# Patient Record
Sex: FEMALE | Race: WHITE
[De-identification: names, ages, dates, MRNs, and addresses within clinical notes are randomized per-mention and may not be internally consistent; named-entity substitution may affect disease eponyms.]

---

## 2017-01-25 ENCOUNTER — HOSPITAL ENCOUNTER (OUTPATIENT)
Dept: HOSPITAL 84 - D.MRI | Age: 57
Discharge: HOME | End: 2017-01-25
Attending: ORTHOPAEDIC SURGERY
Payer: OTHER GOVERNMENT

## 2017-01-25 VITALS — BODY MASS INDEX: 38.1 KG/M2

## 2017-01-25 DIAGNOSIS — M25.572: Primary | ICD-10-CM

## 2017-03-10 ENCOUNTER — HOSPITAL ENCOUNTER (INPATIENT)
Dept: HOSPITAL 84 - D.ER | Age: 57
LOS: 1 days | Discharge: HOME | DRG: 249 | End: 2017-03-11
Attending: INTERNAL MEDICINE | Admitting: INTERNAL MEDICINE
Payer: OTHER GOVERNMENT

## 2017-03-10 VITALS
HEIGHT: 63 IN | HEIGHT: 63 IN | WEIGHT: 235.39 LBS | WEIGHT: 235.39 LBS | BODY MASS INDEX: 41.71 KG/M2 | BODY MASS INDEX: 41.71 KG/M2

## 2017-03-10 VITALS — DIASTOLIC BLOOD PRESSURE: 90 MMHG | SYSTOLIC BLOOD PRESSURE: 131 MMHG

## 2017-03-10 DIAGNOSIS — Z87.891: ICD-10-CM

## 2017-03-10 DIAGNOSIS — I21.4: Primary | ICD-10-CM

## 2017-03-10 LAB
ALBUMIN SERPL-MCNC: 3.2 G/DL (ref 3.4–5)
ALP SERPL-CCNC: 86 U/L (ref 46–116)
ALT SERPL-CCNC: 28 U/L (ref 10–68)
ANION GAP SERPL CALC-SCNC: 11.3 MMOL/L (ref 8–16)
BASOPHILS NFR BLD AUTO: 0.3 % (ref 0–2)
BILIRUB SERPL-MCNC: 0.28 MG/DL (ref 0.2–1.3)
BUN SERPL-MCNC: 8 MG/DL (ref 7–18)
CALCIUM SERPL-MCNC: 9.1 MG/DL (ref 8.5–10.1)
CHLORIDE SERPL-SCNC: 99 MMOL/L (ref 98–107)
CHOLEST/HDLC SERPL: 3.9 RATIO (ref 2.3–4.1)
CK MB SERPL-MCNC: 2.8 U/L (ref 0–3.6)
CK SERPL-CCNC: 92 UL (ref 21–215)
CO2 SERPL-SCNC: 30.9 MMOL/L (ref 21–32)
CREAT SERPL-MCNC: 0.7 MG/DL (ref 0.6–1.3)
EOSINOPHIL NFR BLD: 5.6 % (ref 0–7)
ERYTHROCYTE [DISTWIDTH] IN BLOOD BY AUTOMATED COUNT: 15.4 % (ref 11.5–14.5)
GLOBULIN SER-MCNC: 4.4 G/L
GLUCOSE SERPL-MCNC: 100 MG/DL (ref 74–106)
HCT VFR BLD CALC: 43.2 % (ref 36–48)
HDLC SERPL-MCNC: 53 MG/DL (ref 32–96)
HGB BLD-MCNC: 14 G/DL (ref 12–16)
IMM GRANULOCYTES NFR BLD: 0.4 % (ref 0–5)
LDL-HDL RATIO: 2.5 RATIO (ref 1.5–3.5)
LDLC SERPL-MCNC: 135 MG/DL (ref 0–100)
LYMPHOCYTES NFR BLD AUTO: 22.5 % (ref 15–50)
MCH RBC QN AUTO: 27.6 PG (ref 26–34)
MCHC RBC AUTO-ENTMCNC: 32.4 G/DL (ref 31–37)
MCV RBC: 85.2 FL (ref 80–100)
MONOCYTES NFR BLD: 6.2 % (ref 2–11)
NEUTROPHILS NFR BLD AUTO: 65 % (ref 40–80)
OSMOLALITY SERPL CALC.SUM OF ELEC: 271 MOSM/KG (ref 275–300)
PLATELET # BLD: 279 10X3/UL (ref 130–400)
PMV BLD AUTO: 10.6 FL (ref 7.4–10.4)
POTASSIUM SERPL-SCNC: 4.2 MMOL/L (ref 3.5–5.1)
PROT SERPL-MCNC: 7.6 G/DL (ref 6.4–8.2)
RBC # BLD AUTO: 5.07 10X6/UL (ref 4–5.4)
SODIUM SERPL-SCNC: 137 MMOL/L (ref 136–145)
TRIGL SERPL-MCNC: 103 MG/DL (ref 30–200)
TROPONIN I SERPL-MCNC: 0.49 NG/ML (ref 0–0.06)
WBC # BLD AUTO: 10.5 10X3/UL (ref 4.8–10.8)

## 2017-03-10 NOTE — HEMODYNAMI
PATIENT:VALDO CAMACHO                        MEDICAL RECORD: [id number]
: 60                                            LOCATION[facility]     D.2123
[id number]                                       ADMISSION DATE: 03/10/17
 
 
 Generatedon:3/11/685255:13
Patient name: VALDO CAMACHO Patient #: [id number] Visit #: [id number] SSN: YOB: 1960
Date of study: 3/11/2017
Page: Of
Hemodynamic Procedure Report
****************************
Patient Data
Patient Demographics
Procedure consent was obtained
First Name: VALDO        Gender: Female
Last Name: DOUG            : 1960
Middle Initial: ANDRZEJ FORMAN  Age: 57 year(s)
Patient #: [id number]       Race: Unknown
Visit #: [id number]
Accession #:
[id number]
Additional ID: [id number]
Contact details
Address: [address] [place] [address] ROAD    Phone: [phone]
State: [place]
City: [place]
Zip code: [address]
Past Medical History
Allergies: No known allergies
Admission
Admission Data
Admission Date: 3/10/2017   Admission Time: 18:10
Admit Source: Emergency     Insurance Payor: Private
department                  health insurance
Room #: D.2123
Height (in.): 63            BSA: 1.99 (m2)
Height (cm.): 160.02        BMI: 38.09 (kg/m2)
Weight (lbs.): 215
Weight (kg.): 97.52
Lab Results
Lab Result Date: 3/10/2017  Lab Result Time: 5:00
Biochemistry
Name         Units    Result                Min      Max
BUN          mg/dl    8        --(*---)--   7        18
Creatinine   mg/dl    0.7      --(*---)--   0.6      1.3
CBC
Name         Units    Result                Min      Max
Hematocrit   %        43.2     --(*---)--   42       54
Hemoglobin   g/dl     14       --(*---)--   13.5     17.5
Procedure
Procedure Types
Cath Procedure
Diagnostic Procedure
C
[facility] w/Coronaries
PCI Procedure
Coronary Stent Initial
Miscellaneous Procedures
 
Moderate Sedation up to 15 minutes
Procedure Description
Procedure Date
Procedure Date: 3/11/2017
Procedure Start Time: 9:58
Procedure End Time: 10:13
Procedure Staff
Name                            Function
Ha Cisneros MD                Performing Physician
Brenda Brush RT             Scrub
Ricci Mir RN                Nurse
Boubacar Pineda RT                  Monitor
Procedure Data
Cath Procedure
Fluoroscopy
Diagnostic fluoroscopy      Total fluoroscopy dose: 292
dose: 292 mGy               mGy
Contrast Material
Contrast Material Type                       Amount (ml)
Isovue 300                                   59
Entry Location
Entry     Primary  Successful  Side  Size  Upsize Upsize Entry    Closure Succes
sful  Closure
Location                             (Fr)  1 (Fr) 2 (Fr) Remarks  Device        
      Remarks
Femoral                        Right 5 Fr  6 Fr                   Exoseal
artery                                     Short
Estimated blood loss: 10 ml
Diagnostic catheters
Device Type               Used For           End Catheter
Placement
Cordis 5Fr Pigtail        Procedure
Catheter (MP)
Cordis 5Fr JL 4.0         Procedure
Catheter (MP)
Cordis 5Fr 3DRC Catheter  Procedure
(MP)
Procedure Complications
No complications
Procedure Medications
Medication           Administration Route Dosage
Oxygen               NC                   2 l/min
Heparin Flush Bag    added to field       2 bags
(1000units/500ml NS)
0.9% NaCl            I.V.                 100 ml/hr
Benadryl             I.V.                 50 mg
Fentanyl             I.V.                 50 mcg
Versed               I.V.                 1 mg
Fentanyl             I.V.                 50 mcg
Versed               I.V.                 1 mg
Heparin Bolus        I.V.                 4000 units
Plavix               P.O.                 75 mg
Hemodynamics
Rest
BSA: 1.99 (m2) HGB: 14 (g/dl) O2 Consumption: Estimated: 193.48 (ml/min) O2 Cons
umption indexed:
Estimated:97.23 (ml/min/m) Heart Rate: 74 (bpm)
Snapshots
Pre Cath      Intra         NCS           Post Cath
Vital Signs
 
Time     Heart  Resp   SPO2 etCO2   [id number] NIBP (mmHg) Rhythm  Pain    Sedation
Rate   (ipm)  (%)  (mmHg)  (mmHg)                      Status  Level
(bpm)
9:49:03  71     21     96   0       0       117/80(98)  NSR     0 (11)  10(A)
, No
pain
9:53:13  72     20     93   0       0       120/77(101) NSR     0 (11)  10(A)
, No
pain
9:57:19  71     17     94   0       0       114/77(96)  NSR     0 (11)  10(A)
, No
pain
10:01:29 79     18     95   0       0       116/72(91)  NSR     0 (11)  9(A)
, No
pain
10:05:35 83     18     95   0       0       122/75(105) NSR     0 (11)  9(A)
, No
pain
10:09:44 84     18     94   0       0       107/63(89)  NSR     0 (11)  10(A)
, No
pain
Medications
Time     Medication       Route  Dose  Verified Delivered Reason          Notes 
 Effectiveness
by       by
9:51:16  Oxygen           NC     2     Ricci   Ricci    Per physician
l/min Clarke Mir RN
RN
9:51:28  Heparin Flush    added  2     Ricci   Ricci    used for
Bag              to     bags  Clarke iMr RN procedure
(1000units/500ml field        RN
NS)
9:51:42  0.9% NaCl        I.V.   100   Ricci   Ricci    Per physician
ml/hr Clarke Mir RN
RN
9:52:06  Benadryl         I.V.   50 mg Ricci   Ricci    Per physician
Clarke Mir RN
RN
9:57:39  Fentanyl         I.V.   50    Ricci   Ricci    for sedation
mcg   Clarke Mir RN
RN
9:57:47  Versed           I.V.   1 mg  Ricci   Ricci    for sedation
Clarke Mir RN
RN
10:02:31 Fentanyl         I.V.   50    Ricci   Ricci    for sedation
mcg   Clarke Mir RN
RN
10:02:35 Versed           I.V.   1 mg  Ricci   Ricci    for sedation
Clarke Mir RN
RN
10:02:56 Heparin Bolus    I.V.   4000  Ricci   Ricci    for
units Clarke Mir RN anticoagulation
RN
10:08:52 Plavix           P.O.   75 mg Ricci   Ricci    for
Clarke Mir RN antiplatelet
RN                 therapy
Procedure Log
Time     Note
9:22:40  Informed consent obtained and on chart
9:23:14  **ACC** Patient presents with Unstable Angina CCS
 
Anginal Class 3--Marked limitation of physical
activity, angina occurs with ordinary activity..
9:23:22  Ricci Mir RN sent for patient. Start room use.
9:23:58  Time tracking: Regular hours
9:24:02  Plan of Care:Hemodynamics will remain stable., Cardiac
rhythm will remain stable., Comfort level will be
maintained., Respiratory function will remain
adequate., Patient/ family verbilizes understanding of
procedure., Procedure tolerated without complication.,
Recovers from procedure without complications..
9:24:59  Lab Result : Creatinine 0.7 mg/dl
9:24:59  Lab Result : BUN 8 mg/dl
9:24:59  Lab Result : Hemoglobin 14 g/dl
9:24:59  Lab Result : Hematocrit 43.2 %
9:25:06  Lab results completed and on chart.
9:41:07  Patient received from PCU to CCL 1 Alert and oriented.
Tansferred to table in Supine position.
9:41:08  Warm blankets applied, and valentina hugger turned on for
patient comfort.
9:41:09  Correct patient and procedure confirmed by team.
9:41:10  ECG and BP/O2 sat monitors applied to patient.
9:41:11  Full Disclosure recording started
9:48:03  Vital chart was started
9:51:16  Oxygen 2 l/min NC was given by Ricci Mir RN; Per
physician;
9:51:28  Heparin Flush Bag (1000units/500ml NS) 2 bags added to
field was given by Ricci Mir RN; used for
procedure;
9:51:42  0.9% NaCl 100 ml/hr I.V. was given by Ricci Mir
RN; Per physician;
9:52:06  Benadryl 50 mg I.V. was given by Ricci Mir RN; Per
physician;
9:52:24  Baseline sample Acquired.
9:52:27  Rhythm: sinus rhythm
9:53:48  H&P Date Dictated: 3/10/2017 Within 30 days and on
chart..
9:53:49  Pre-procedure instructions explained to patient.
9:53:50  Pre-op teaching completed and patient verbalized
understanding.
9:53:51  Family in patients room.
9:53:52  Patient NPO since Midnight.
9:53:58  Patient allergic to No known allergies
9:54:01  Is the patient allergic to Iodine/contrast media? No.
9:54:21  Was the patient premedicated? No
9:54:22  Is patient on blood thinner?Yes
9:54:26  **ACC** The patient was administered the following
blood thiners within the last 24 hours: **ACC**Plavix
9:54:29  Patient diabetic? No.
9:54:42  Previous problem with sedation/anesthesia? No ?
9:54:43  Snore? Yes
9:54:44  Sleep apnea? No
9:54:46  Deviated septum? No
9:54:48  Opens mouth fully? No
9:54:49  Sticks out tongue? Yes
9:54:51  Airway obstruction? No ?
9:54:55  Dentures? Yes in tight
9:54:59  Modified Mendoza's test Ulnar > 7 seconds.
9:55:01  Patient pain scale 0/10 ?.
9:55:34  IV patent on arrival in left antecubital with 0.9%
NaCl at [facility].
 
9:55:42  Right groin area was prepped with chlora-prep and
draped in sterile fashion
9:55:42  Alarms reviewed by R. N.
9:55:43  Sharps counted by scrub and verified by R.N.
9:55:46  Use device set Femoral Dx
9:55:47  Tegaderm 4 x 4 opened to sterile field.
9:55:49  Acist Syringe opened to sterile field.
9:55:50  Bag Decanter opened to sterile field.
9:55:50  Medline Cath Pack opened to sterile field.
9:55:51  Terumo 5Fr [place] Sheath opened to sterile field.
9:55:52  Diagnostic Infinity 5Fr Multipack catheter opened to
sterile field.
9:55:53  Acist Manifold opened to sterile field.
9:55:53  Acist Hand Control opened to sterile field.
9:55:54  St Shaan 260cm J .035 wire opened to sterile field.
9:56:08  Physician arrived
9:56:08  --------ALL STOP TIME OUT------
9:56:09  Final Timeout: patient, procedure, and site verified
with staff and physician. All members of the team are
in agreement.
9:56:11  Right groin site verified by team.
9:56:14  Physical assessment completed. ASA score P 2 - A
patient with mild systemic disease as per Ha Cisneros MD.
9:57:39  Fentanyl 50 mcg I.V. was given by Ricci Mir RN;
for sedation;
9:57:47  Versed 1 mg I.V. was given by Ricci Mir RN; for
sedation;
9:58:39  Procedure started.
9:58:41  Local anesthetic to right femoral artery with
Lidocaine 2% by Ha Cisneros MD.**INITIAL ACCESS
ONLY**
9:58:47  Zero performed for pressure channel P1
9:59:01  A 5 Fr sheath was inserted into the Right Femoral
artery
9:59:09  A Cordis 5Fr Pigtail Catheter (MP) was advanced over
the wire and used for Procedure.
9:59:46  LV gram done using YANEZ
9:59:49  Injector settings: Ml/sec: 10, Volume: 20,
9:59:52  EF : 60 %
9:59:54  Catheter exchanged over wire.
9:59:59  A Cordis 5Fr JL 4.0 Catheter (MP) was advanced over
the wire and used for Procedure.
10:00:00 LCA angiography performed.
10:00:34 Terumo 6Fr [place] Sheath opened to sterile field.
10:00:34 Parry Whisper J 300cm 0.014 guide wire opened to
sterile field.
10:00:35 Merit BasixCompak Inflation Kit opened to sterile
field.
10:00:49 Catheter exchanged over wire.
10:00:53 A Cordis 5Fr 3DRC Catheter (MP) was advanced over the
wire and used for Procedure.
10:01:28 RCA angiography performed.
10::44 Cordis 6FR XBLAD 3.5 guide catheter opened to sterile
field.
10::46 Catheter removed.
10:02:03 Sheath upsized to a 6 Fr Short.
10:02:07 6 Fr xblad 3.5 guide catheter was inserted over the
wire
10:02:31 Fentanyl 50 mcg I.V. was given by Ricci Mir RN;
 
for sedation;
10:02:35 Versed 1 mg I.V. was given by Ricci Mir RN; for
sedation;
10:02:40 whisper wire advanced.
10:02:56 Heparin Bolus 4000 units I.V. was given by Ricci Mir RN; for anticoagulation;
10:04:05 Wire advanced across lesion.
10:04:27 Inflation Number: 1 A Medtronic Integrity 2.5 x 14
stent was prepped and advanced across the Prox CX. The
stent was deployed at 13 VIOLETTA for 0:10 (min:sec).
10:04:48 **ACC** Post-intervention ROSA Flow is 3.
10:04:49 Stent catheter was removed intact over wire.
10:04:49 Wire removed.
10:04:49 Guide catheter removed.
10:05:34 Cordis 6Fr Exoseal opened to sterile field.
10:05:44 Sheath removed intact; hemostasis achieved with
Exoseal to the Right Femoral artery.
10:05:46 Procedure ended.(Physican Out)
10:08:52 Plavix 75 mg P.O. was given by Ricci Mir RN; for
antiplatelet therapy;
10:10:20 Admit Source: Emergency department
10:10:50 Insurance Payor : Private health insurance
10:10:59 Patient Weight : 215 lbs
10:11:01 Patient Height : 63 inches
10:11:28 Fluoroscopy dose: 292 mGy
10:11:28 Flurop Dose total: 292
10:11:31 Contrast amount:Isovue 300 59ml.
10:11:32 Sharps counted by scrub and verified by R.N.
10:11:34 Insertion/operative site no bleeding no hematoma.
10:11:36 Post-op/insertion site Right Femoral artery dressed
using a 4 x 4 and Tegaderm.
10:11:40 Post right femoral artery:stable, soft, clean and dry
10:12:01 Post Procedure Pulses reassessed and unchanged
10:12:07 Post-procedure physical assessment completed. ASA
score P 2 - A patient with mild systemic disease as
per Ha Cisneros MD.
10:12:25 Post procedure rhythm: unchanged.
10:12:28 Estimated blood loss: 10 ml
10:12:30 Post procedure instruction explained to
patient.Patient verbalizes understanding.
10:12:30 Patient needs reinforcement of post procedure
teaching.
10:12:46 Procedure type changed to Cath procedure, Diagnostic
procedure, LHC, LHC w/Coronaries, PCI procedure,
Coronary Stent Initial, Miscellaneous Procedures,
Moderate Sedation up to 15 minutes
10:13:10 Procedure and supply charges have been captured,
reviewed, submitted and are correct.
10:13:12 Procedure Complication : No complications
10:13:14 Vital chart was stopped
10:13:14 See physician's report for complete and final results.
10:13:15 Report given to PCU.
10:13:18 Patient transfered to PCU with Stretcher.
10:13:20 Procedure ended.
10:13:20 Full Disclosure recording stopped
10:13:31 **ACC-PCI Only** Patient was given prescriptions, or
instructed by Ha Cisneros MD to start/continue the
following medications upon discharge: Plavix
10:13:32 End room use (Document Last)
Intervention Summary
 
Intervention Notes
Time     ActionType  Lesion and  Equipment Action#  Pressure  Duration
Attributes  Used
10:04:27 Place stent Prox CX     Medtronic 1        13        00:10
Integrity
2.5 x 14
stent
Device Usage
Item Name   Manufacture  Quantity  Catalog    Hospital Part    Current Minimal L
ot# /
Number     Charge   Number  Stock   Stock   Serial#
Code
Tegaderm 4  3M           1         [id number]      [id number]   [id number]  233055  5
x 4
Acist       Acist        1         33800      959792   903720  514364  20
Syringe     Medical
Systems Inc
Bag         Microtek     1         2002S      303049   96972   574378  5
[place]    Medical Inc.
Medline     Cardinal     1         [id number]  193249   66539   195138  5
Cath Pack   Health
Terumo 5Fr  Terumo       1         [id number]     900651   157311  028460  40
[place]
Sheath
Diagnostic  Cardinal     1         [id number]     440406   04368   188296  30
Infinity    Health
5Fr
Multipack
catheter
Acist       Acist        1         78041      933296   703116  987610  5
Manifold    Medical
Systems Inc
Acist Hand  Acist        1         09502      482882   176962  192007  5
Control     Medical
Systems Inc
St Shaan     St Shaan      1         903562     068841   582003  877889  30
260cm J
.035 wire
Cordis 5Fr  Cardinal     1                                     007866  5
Pigtail     Health
Catheter
(MP)
Cordis 5Fr  Cardinal     1                                     445239  5
JL 4.0      Health
Catheter
(MP)
Terumo 6Fr  Terumo       1         [id number]     214817   739469  360748  40
[place]
Sheath
Abbott      Abbott       1         [id number]  450078   625436  651028  5
Whisper J   Vascular
300cm 0.014
guide wire
Merit       Merit        1         [id number]     381511   395291  320385  15
[facility] Medical
Inflation
Kit
Cordis 5Fr  Cardinal     1                                     692298  5
3DRC        Health
Catheter
 
(MP)
Cordis 6FR  Cardinal     1         [id number]   928010   235633  930319  10
XBLAD 3.5   Health
guide
catheter
Medtronic   Medtronic    1         [id number]  223924           391777  1       0
[id number]
Integrity
2.5 x 14
stent
Cordis 6Fr  Cardinal     1               539762   870199  487806  10
Ziippi
Signature Audit Sylvia
Stage           Time        Signature      Unsigned
Intra-Procedure 3/11/2017   Boubacar Pineda
10:13:42 AM RT(R)
Signatures
Monitor : Boubacar Pineda RT Signature :
______________________________
Date : ______________ Time :
______________
 
 
 
 
 
 
 
 
 
 
 
 
 
 
 
 
 
 
 
 
 
 
 
 
 
 
 
 
 
 
 
 
 
 
[id number] [place] [address]

## 2017-03-10 NOTE — NUR
PT C/O CHEST PRESSURE. SR PER TELEMETRY. ADMINISTERED MORPHINE 4MG SIVP AND
ZOFRAN 4MG SIVP AT THIS TIME. WILL MONITOR.

## 2017-03-10 NOTE — NUR
PT AND  BOTH VERY UPSET THAT THEY CANNOT GO OUTSIDE TO SMOKE. EXPLAINED
THAT THIS IS A NON-SMOKING CAMPUS PER [place] LAW AND THAT THE SECURITY
GAURDS DO ENFORCE THIS. SPOUSE IS VERY UPSET, SAYING A YEAR AGO HE WAS A
PATIENT AND HE SMOKED OUTSIDE "ALL THE TIME". CALMED PT AND SPOUSE DOWN.
 
PAGE TO DR URIAS, REPORTED THAT PT IS A VERY HEAVY SMOKER AND IS UPSET ABOUT
NOT BEING ABLE TO SMOKE. RECEIVED ORDER FOR A NICOTINE 21MG PATCH AND ATIVAN
FOR RESTLESSNESS AND ANXIETY.
 
NOTIFIED HOUSE SUPERVISOR THAT NICOTINE PATCH WAS ORDERED AND NEEDED.

## 2017-03-11 VITALS — DIASTOLIC BLOOD PRESSURE: 80 MMHG | SYSTOLIC BLOOD PRESSURE: 126 MMHG

## 2017-03-11 VITALS — DIASTOLIC BLOOD PRESSURE: 78 MMHG | SYSTOLIC BLOOD PRESSURE: 121 MMHG

## 2017-03-11 VITALS — DIASTOLIC BLOOD PRESSURE: 75 MMHG | SYSTOLIC BLOOD PRESSURE: 116 MMHG

## 2017-03-11 VITALS — SYSTOLIC BLOOD PRESSURE: 115 MMHG | DIASTOLIC BLOOD PRESSURE: 84 MMHG

## 2017-03-11 VITALS — SYSTOLIC BLOOD PRESSURE: 121 MMHG | DIASTOLIC BLOOD PRESSURE: 71 MMHG

## 2017-03-11 VITALS — DIASTOLIC BLOOD PRESSURE: 82 MMHG | SYSTOLIC BLOOD PRESSURE: 118 MMHG

## 2017-03-11 VITALS — SYSTOLIC BLOOD PRESSURE: 129 MMHG | DIASTOLIC BLOOD PRESSURE: 82 MMHG

## 2017-03-11 VITALS — SYSTOLIC BLOOD PRESSURE: 131 MMHG | DIASTOLIC BLOOD PRESSURE: 90 MMHG

## 2017-03-11 LAB
ANION GAP SERPL CALC-SCNC: 12.2 MMOL/L (ref 8–16)
BASOPHILS NFR BLD AUTO: 0.5 % (ref 0–2)
BUN SERPL-MCNC: 9 MG/DL (ref 7–18)
CALCIUM SERPL-MCNC: 8.8 MG/DL (ref 8.5–10.1)
CHLORIDE SERPL-SCNC: 103 MMOL/L (ref 98–107)
CO2 SERPL-SCNC: 27.8 MMOL/L (ref 21–32)
CREAT SERPL-MCNC: 0.7 MG/DL (ref 0.6–1.3)
EOSINOPHIL NFR BLD: 7.8 % (ref 0–7)
ERYTHROCYTE [DISTWIDTH] IN BLOOD BY AUTOMATED COUNT: 15.8 % (ref 11.5–14.5)
GLUCOSE SERPL-MCNC: 117 MG/DL (ref 74–106)
HCT VFR BLD CALC: 41.4 % (ref 36–48)
HGB BLD-MCNC: 13.1 G/DL (ref 12–16)
IMM GRANULOCYTES NFR BLD: 0.3 % (ref 0–5)
LYMPHOCYTES NFR BLD AUTO: 32 % (ref 15–50)
MCH RBC QN AUTO: 27.6 PG (ref 26–34)
MCHC RBC AUTO-ENTMCNC: 31.6 G/DL (ref 31–37)
MCV RBC: 87.3 FL (ref 80–100)
MONOCYTES NFR BLD: 7.8 % (ref 2–11)
NEUTROPHILS NFR BLD AUTO: 51.6 % (ref 40–80)
OSMOLALITY SERPL CALC.SUM OF ELEC: 277 MOSM/KG (ref 275–300)
PLATELET # BLD: 249 10X3/UL (ref 130–400)
PMV BLD AUTO: 11 FL (ref 7.4–10.4)
POTASSIUM SERPL-SCNC: 4 MMOL/L (ref 3.5–5.1)
RBC # BLD AUTO: 4.74 10X6/UL (ref 4–5.4)
SODIUM SERPL-SCNC: 139 MMOL/L (ref 136–145)
WBC # BLD AUTO: 7.7 10X3/UL (ref 4.8–10.8)

## 2017-03-11 PROCEDURE — B2151ZZ FLUOROSCOPY OF LEFT HEART USING LOW OSMOLAR CONTRAST: ICD-10-PCS | Performed by: INTERNAL MEDICINE

## 2017-03-11 PROCEDURE — B2111ZZ FLUOROSCOPY OF MULTIPLE CORONARY ARTERIES USING LOW OSMOLAR CONTRAST: ICD-10-PCS | Performed by: INTERNAL MEDICINE

## 2017-03-11 PROCEDURE — 02703DZ DILATION OF CORONARY ARTERY, ONE ARTERY WITH INTRALUMINAL DEVICE, PERCUTANEOUS APPROACH: ICD-10-PCS | Performed by: INTERNAL MEDICINE

## 2017-03-11 PROCEDURE — 4A023N7 MEASUREMENT OF CARDIAC SAMPLING AND PRESSURE, LEFT HEART, PERCUTANEOUS APPROACH: ICD-10-PCS | Performed by: INTERNAL MEDICINE

## 2017-03-11 NOTE — NUR
REVIEWED DISCHARGE INSTRUCTIONS WITH PT STATES UNDERSTANDING COPY GIVEN PT
DCD SALINE LOCK TO RAC WITH 20 GA IV CATH INTACT SITE FREE OR [facility] OR
[facility] DISCHARGED HOME LEFT UNIT IN STABLE CONDITION WITH ALL PERSONAL
BELONGINGS VIA W/C

## 2017-03-13 NOTE — HP
PATIENT: VALDO CAMACHO                       MEDICAL RECORD: [id number]
ACCOUNT: [id number]                                    LOCATION[facility]
: 60                                            ADMISSION DATE: 03/10/17
                                                         
 
                             HISTORY AND PHYSICAL EXAMINATION
 
 
DIAGNOSES:
1.  Non-Q-wave myocardial infarction.
2.  Smoking history.
 
HISTORY OF PRESENT ILLNESS:  Mrs. Camacho presents with 2 weeks of increasing
chest pain, chest discomfort that is severe yesterday.  Troponin is positive for
a myocardial infarction.  She still has minor chest pain.
 
PHYSICAL EXAMINATION:
GENERAL APPEARANCE:  Well-nourished, well-developed, appears stated age.  Level
of distress, comfortable. 
PSYCHIATRIC:  Mental status, alert, normal affect.  Orientation, oriented to
time, place and person.  
EYES:  Lids and conjunctiva, noninjected.  No discharge, no pallor. 
ENT:  Lips, teeth, gums, normal dentition.  Oropharynx, no cyanosis, no pallor. 
NECK:  Carotid arteries, bilateral normal upstroke, no bruits, no thrills. 
JUGULAR VEINS:  No jugular venous pressure or distention. 
CERVICAL LYMPH NODES:  Nontender, nonenlarged.  
THYROID:  Not enlarged.  Nontender.  No nodules. 
LUNGS:  Respiratory effort, unlabored. 
CHEST:  Normal curvature.  No thoracic deformity.  No chest wall tenderness. 
Percussion, resonant.  Auscultation, clear.  No wheezes, no rales, no rhonchi. 
CARDIOVASCULAR:  Precordial exam, nondisplaced.  No heaves or pericardial
thrills.  Rate and rhythm, regular.  Heart sounds, normal S1, normal S2.  No S3,
no gallop, no rub.  Systolic murmur, not heard.  Diastolic murmur, not heard. 
EXTREMITIES:  No cyanosis, no edema.  Peripheral pulses, full and equal in all
extremities, except as noted.  No bruits appreciated. 
ABDOMEN:  Soft, nondistended.  Normal aorta.  No bruit.  Nontender.  No masses. 
Liver, nontender, no hepatomegaly.  Spleen, nontender, no splenomegaly.  
MUSCULOSKELETAL:  No joint tenderness.  No joint swelling.  No erythema.  
NEUROLOGICAL:  Normal gait, normal strength, normal tone.
SKIN:  Warm and dry.
 
REVIEW OF SYSTEMS:  The patient reports easy bruising but reports no swollen
glands. The patient reports no fever, no night sweats, no significant weight
gain, no significant weight loss.  No significant exercise tolerance.  The
patient reports no dry eyes, no irritation, no vision change.  Patient reports
no difficulty hearing and no ear pain.  Patient reports no frequent nose bleeds
or nose and sinus problems.  Patient reports on arm pain on exertion.   No
shortness of breath while lying down.  No history of heart murmur.  Patient
reports no cough, no wheezing or coughing up blood.  Patient reports no
abdominal pain, no vomiting.  Normal appetite.  No diarrhea and not vomiting
blood.  No nausea and no constipation.  Patient reports no incontinence.  No
difficulty urinating.  No hematuria.  No increased frequency.  Patient reports
no muscle aches.  No weakness, no arthralgias, no back pain.  No swelling of the
extremities.  Patient reports no abnormal mole, no jaundice, no rashes.  Reports
no loss of consciousness.  No weakness and no numbness.  No seizures, dizziness,
or headaches.  The patient reports no depression, no sleep disturbance, feeling
safe in a relationship and no alcohol abuse.  Patient reports on fatigue. 
Reports no runny nose or sinus pressure.  No itching, no hives, and no frequent
 
 
 
HISTORY AND PHYSICAL                           [id number]    VALDO CAMACHO
 
 
sneezing.
 
OVERALL IMPRESSION:  Non-Q-wave myocardial infarction.  We will proceed with
coronary angiography.  Further care depends upon findings of the angiography.
 
TRANSINT:[id number] Voice Confirmation ID: [id number] DOCUMENT ID: [id number]
 
 
                                           
                                           SEVERIANO URIAS MD             
 
 
 
Electronically Signed by SEVERIANO URIAS on 17 at 1009
 
 
 
 
 
 
 
 
 
 
 
 
 
 
 
 
 
 
 
 
 
 
 
 
 
 
 
 
 
 
 
 
 
CC:                                                             [id number]
DICTATION DATE: 17     :     1746      DIS IN  
                                                                      17
[address] [place] [address]

## 2017-03-13 NOTE — OP
PATIENT NAME:  VALDO CAMACHO                 MEDICAL RECORD: [id number]
:60                                             LOCATION:D.M2     D.2123
                                                         ADMISSION DATE:03/10/17
SURGEON:  SEVERIANO URIAS MD             
 
 
DATE OF OPERATION:  2017
 
PROCEDURES:
1.  PTCA stent left circumflex.
2.  Left heart catheterization.
3.  Selective coronary angiography.
4.  Left ventriculogram.
 
INDICATION:  Non-Q-wave myocardial infarction.
 
PROCEDURE IN DETAIL:  After informed consent was obtained and after detailed
explanation of risks, benefits as well as alternative therapies, the patient
elected to proceed with angiogram and angioplasty.  The right femoral area was
prepped and draped in normal sterile fashion.  The right femoral artery was
cannulated via modified Seldinger technique with placement of 6-Mosotho sheath. 
All catheters exchanged through this sheath.
 
FINDINGS:  The left ventriculogram was performed in standard 30-degree YANEZ view,
reveals good cardiac wall motion throughout all segments.  Overall ejection
fraction estimated 60%.
 
SELECTIVE CORONARY ANGIOGRAPHY:
1.  Left main has no significant angiographic disease.
2.  Left anterior descending has moderate irregularities, but no flow-limiting
stenosis.
3.  The left circumflex has 99% stenosis proximally, otherwise only mild
irregularities.
4.  The right coronary has moderate irregularities, but no flow-limiting
stenosis.
 
PTCA STENT OF THE LEFT CIRCUMFLEX:  The stent used is a 2.5 x 14 mm Integrity. 
Result was 0% residual stenosis.
 
OVERALL IMPRESSION:  Successful percutaneous transluminal coronary angioplasty
stent of the left circumflex going from 99% initial stenosis to 0% residual.
 
TRANSINT:[id number] Voice Confirmation ID: [id number] DOCUMENT ID: [id number]
                                           
                                           SEVERIANO URIAS MD             
 
 
 
Electronically Signed by SEVERIANO URIAS on 17 at 1009
 
CC:                                                             [id number]
DICTATION DATE: 17 1011     :     17 1733      DIS IN  
                                                                      17
[address] [place] [address]

## 2017-03-13 NOTE — DS
PATIENT:VALDO CAMACHO         :60   MEDICAL RECORD: [id number]
 
                              DISCHARGE SUMMARY
                                                         
ADMISSION DATE:    03/10/17                       DISCHARGE DATE:     17
 
 
DIAGNOSES:
1. Non-Q-wave myocardial infarction.
2. Percutaneous transluminal coronary angioplasty stent left circumflex this
admission.
 
HOSPITAL COURSE:  Ms. Camacho presents with a non-Q-wave myocardial infarction and
underwent cardiac catheterization revealing 99% stenosis to the left circumflex,
underwent successful PTCA stent of this territory.  He had an uneventful postop
course.  He was discharged home with the addition of aspirin, Plavix, Pravachol,
Toprol to her medical regimen.  We will follow up with [facility] in
1 month.
 
TRANSINT:[id number] Voice Confirmation ID: [id number] DOCUMENT ID: [id number]
                                           
                                           SEVERIANO URIAS MD             
 
 
 
Electronically Signed by SEVERIANO URIAS on 17 at 1009
 
 
 
 
 
 
 
 
 
 
 
 
 
 
 
 
 
 
 
 
 
 
 
 
CC:                                                             [id number]
DICTATION DATE: 17 1010     :     17 2321      DIS IN  
                                                                      17
[facility]                                          
[address] [place] [address]

## 2017-08-23 ENCOUNTER — HOSPITAL ENCOUNTER (EMERGENCY)
Dept: HOSPITAL 84 - D.ER | Age: 57
Discharge: HOME | End: 2017-08-23
Payer: OTHER GOVERNMENT

## 2017-08-23 VITALS — BODY MASS INDEX: 38.1 KG/M2

## 2017-08-23 DIAGNOSIS — I10: ICD-10-CM

## 2017-08-23 DIAGNOSIS — R09.89: ICD-10-CM

## 2017-08-23 DIAGNOSIS — R06.02: ICD-10-CM

## 2017-08-23 DIAGNOSIS — F17.200: ICD-10-CM

## 2017-08-23 DIAGNOSIS — E66.9: ICD-10-CM

## 2017-08-23 DIAGNOSIS — J40: Primary | ICD-10-CM

## 2017-08-23 DIAGNOSIS — R05: ICD-10-CM

## 2017-08-23 LAB
ALBUMIN SERPL-MCNC: 2.9 G/DL (ref 3.4–5)
ALP SERPL-CCNC: 88 U/L (ref 46–116)
ALT SERPL-CCNC: 52 U/L (ref 10–68)
AMORPHOUS SEDIMENT: (no result) /LPF
AMPHETAMINES UR QL SCN: NEGATIVE QUAL
ANION GAP SERPL CALC-SCNC: 9 MMOL/L (ref 8–16)
APPEARANCE UR: (no result)
BACTERIA #/AREA URNS HPF: (no result) /HPF
BARBITURATES UR QL SCN: NEGATIVE QUAL
BASOPHILS NFR BLD AUTO: 0 % (ref 0–2)
BENZODIAZ UR QL SCN: NEGATIVE QUAL
BILIRUB SERPL-MCNC: 0.2 MG/DL (ref 0.2–1.3)
BILIRUB SERPL-MCNC: NEGATIVE MG/DL
BUN SERPL-MCNC: 21 MG/DL (ref 7–18)
BZE UR QL SCN: NEGATIVE QUAL
CALCIUM SERPL-MCNC: 8.7 MG/DL (ref 8.5–10.1)
CANNABINOIDS UR QL SCN: POSITIVE QUAL
CHLORIDE SERPL-SCNC: 102 MMOL/L (ref 98–107)
CHOLEST/HDLC SERPL: 2.2 RATIO (ref 2.3–4.1)
CK MB SERPL-MCNC: 0.9 U/L (ref 0–3.6)
CK SERPL-CCNC: 50 UL (ref 21–215)
CO2 SERPL-SCNC: 33.2 MMOL/L (ref 21–32)
COLOR UR: YELLOW
CREAT SERPL-MCNC: 0.8 MG/DL (ref 0.6–1.3)
D DIMER PPP FEU-MCNC: < 0.27 UG/MLFEU (ref 0.2–0.54)
EOSINOPHIL NFR BLD: 0 % (ref 0–7)
ERYTHROCYTE [DISTWIDTH] IN BLOOD BY AUTOMATED COUNT: 17.4 % (ref 11.5–14.5)
GLOBULIN SER-MCNC: 3.8 G/L
GLUCOSE SERPL-MCNC: 100 MG/DL
GLUCOSE SERPL-MCNC: 185 MG/DL (ref 74–106)
HCT VFR BLD CALC: 43.8 % (ref 36–48)
HDLC SERPL-MCNC: 94 MG/DL (ref 32–96)
HGB BLD-MCNC: 13.9 G/DL (ref 12–16)
IMM GRANULOCYTES NFR BLD: 0.4 % (ref 0–5)
INR PPP: 0.86 (ref 0.85–1.17)
KETONES UR STRIP-MCNC: NEGATIVE MG/DL
LDL-HDL RATIO: 1.1 RATIO (ref 1.5–3.5)
LDLC SERPL-MCNC: 100 MG/DL (ref 0–100)
LEUKOCYTE ESTERASE: (no result)
LYMPHOCYTES NFR BLD AUTO: 5.7 % (ref 15–50)
MCH RBC QN AUTO: 28 PG (ref 26–34)
MCHC RBC AUTO-ENTMCNC: 31.7 G/DL (ref 31–37)
MCV RBC: 88.3 FL (ref 80–100)
MONOCYTES NFR BLD: 2.6 % (ref 2–11)
MUCOUS THREADS #/AREA URNS LPF: (no result) /LPF
NEUTROPHILS NFR BLD AUTO: 91.3 % (ref 40–80)
NITRITE UR-MCNC: NEGATIVE MG/ML
OPIATES UR QL SCN: POSITIVE QUAL
OSMOLALITY SERPL CALC.SUM OF ELEC: 286 MOSM/KG (ref 275–300)
PCP UR QL SCN: NEGATIVE QUAL
PH UR STRIP: 7 [PH] (ref 5–6)
PLATELET # BLD: 224 10X3/UL (ref 130–400)
PMV BLD AUTO: 10.1 FL (ref 7.4–10.4)
POTASSIUM SERPL-SCNC: 4.2 MMOL/L (ref 3.5–5.1)
PROT SERPL-MCNC: 6.7 G/DL (ref 6.4–8.2)
PROT UR-MCNC: NEGATIVE MG/DL
PROTHROMBIN TIME: 11.6 SECONDS (ref 11.6–15)
RBC # BLD AUTO: 4.96 10X6/UL (ref 4–5.4)
RBC #/AREA URNS HPF: (no result) /HPF (ref 0–5)
SODIUM SERPL-SCNC: 140 MMOL/L (ref 136–145)
SP GR UR STRIP: 1.01 (ref 1–1.02)
SQUAMOUS #/AREA URNS HPF: (no result) /HPF (ref 0–5)
TRIGL SERPL-MCNC: 75 MG/DL (ref 30–200)
TROPONIN I SERPL-MCNC: < 0.017 NG/ML (ref 0–0.06)
UDS - METH: NEGATIVE QUAL
UROBILINOGEN UR-MCNC: 1 MG/DL
WBC # BLD AUTO: 12.3 10X3/UL (ref 4.8–10.8)
WBC #/AREA URNS HPF: (no result) /HPF (ref 0–5)

## 2017-10-20 ENCOUNTER — HOSPITAL ENCOUNTER (EMERGENCY)
Dept: HOSPITAL 84 - D.ER | Age: 57
Discharge: HOME | End: 2017-10-20
Payer: OTHER GOVERNMENT

## 2017-10-20 VITALS — BODY MASS INDEX: 38.1 KG/M2

## 2017-10-20 DIAGNOSIS — M54.9: ICD-10-CM

## 2017-10-20 DIAGNOSIS — F17.200: ICD-10-CM

## 2017-10-20 DIAGNOSIS — R07.89: Primary | ICD-10-CM

## 2017-10-20 LAB
ALBUMIN SERPL-MCNC: 2.9 G/DL (ref 3.4–5)
ALP SERPL-CCNC: 81 U/L (ref 46–116)
ALT SERPL-CCNC: 16 U/L (ref 10–68)
ANION GAP SERPL CALC-SCNC: 8.1 MMOL/L (ref 8–16)
APTT BLD: 26.2 SECONDS (ref 22.8–39.4)
BASOPHILS NFR BLD AUTO: 0.4 % (ref 0–2)
BILIRUB SERPL-MCNC: 0.3 MG/DL (ref 0.2–1.3)
BUN SERPL-MCNC: 11 MG/DL (ref 7–18)
CALCIUM SERPL-MCNC: 9.1 MG/DL (ref 8.5–10.1)
CHLORIDE SERPL-SCNC: 101 MMOL/L (ref 98–107)
CHOLEST/HDLC SERPL: 4 RATIO (ref 2.3–4.1)
CK MB SERPL-MCNC: 0.2 U/L (ref 0–3.6)
CK SERPL-CCNC: 47 UL (ref 21–215)
CO2 SERPL-SCNC: 31.7 MMOL/L (ref 21–32)
CREAT SERPL-MCNC: 0.8 MG/DL (ref 0.6–1.3)
EOSINOPHIL NFR BLD: 7.2 % (ref 0–7)
ERYTHROCYTE [DISTWIDTH] IN BLOOD BY AUTOMATED COUNT: 16.8 % (ref 11.5–14.5)
GLOBULIN SER-MCNC: 4.6 G/L
GLUCOSE SERPL-MCNC: 135 MG/DL (ref 74–106)
HCT VFR BLD CALC: 36.9 % (ref 36–48)
HDLC SERPL-MCNC: 47 MG/DL (ref 32–96)
HGB BLD-MCNC: 11.9 G/DL (ref 12–16)
IMM GRANULOCYTES NFR BLD: 0.3 % (ref 0–5)
INR PPP: 1.02 (ref 0.85–1.17)
LDL-HDL RATIO: 2.3 RATIO (ref 1.5–3.5)
LDLC SERPL-MCNC: 110 MG/DL (ref 0–100)
LYMPHOCYTES NFR BLD AUTO: 28.8 % (ref 15–50)
MCH RBC QN AUTO: 28.3 PG (ref 26–34)
MCHC RBC AUTO-ENTMCNC: 32.2 G/DL (ref 31–37)
MCV RBC: 87.6 FL (ref 80–100)
MONOCYTES NFR BLD: 4.6 % (ref 2–11)
NEUTROPHILS NFR BLD AUTO: 58.7 % (ref 40–80)
OSMOLALITY SERPL CALC.SUM OF ELEC: 274 MOSM/KG (ref 275–300)
PLATELET # BLD: 298 10X3/UL (ref 130–400)
PMV BLD AUTO: 10 FL (ref 7.4–10.4)
POTASSIUM SERPL-SCNC: 3.8 MMOL/L (ref 3.5–5.1)
PROT SERPL-MCNC: 7.5 G/DL (ref 6.4–8.2)
PROTHROMBIN TIME: 13.2 SECONDS (ref 11.6–15)
RBC # BLD AUTO: 4.21 10X6/UL (ref 4–5.4)
SODIUM SERPL-SCNC: 137 MMOL/L (ref 136–145)
TRIGL SERPL-MCNC: 163 MG/DL (ref 30–200)
TROPONIN I SERPL-MCNC: < 0.017 NG/ML (ref 0–0.06)
WBC # BLD AUTO: 10.6 10X3/UL (ref 4.8–10.8)

## 2018-07-09 ENCOUNTER — HOSPITAL ENCOUNTER (OUTPATIENT)
Dept: HOSPITAL 84 - D.MRI | Age: 58
Discharge: HOME | End: 2018-07-09
Attending: FAMILY MEDICINE
Payer: OTHER GOVERNMENT

## 2018-07-09 VITALS — BODY MASS INDEX: 38.1 KG/M2

## 2018-07-09 DIAGNOSIS — M54.5: Primary | ICD-10-CM

## 2019-07-07 ENCOUNTER — HOSPITAL ENCOUNTER (EMERGENCY)
Dept: HOSPITAL 84 - D.ER | Age: 59
Discharge: HOME | End: 2019-07-07
Payer: OTHER GOVERNMENT

## 2019-07-07 VITALS — SYSTOLIC BLOOD PRESSURE: 108 MMHG | DIASTOLIC BLOOD PRESSURE: 77 MMHG

## 2019-07-07 VITALS — BODY MASS INDEX: 40.84 KG/M2 | WEIGHT: 230.48 LBS | HEIGHT: 63 IN

## 2019-07-07 DIAGNOSIS — R07.9: Primary | ICD-10-CM

## 2019-07-07 LAB
ALBUMIN SERPL-MCNC: 3.1 G/DL (ref 3.4–5)
ALP SERPL-CCNC: 101 U/L (ref 46–116)
ALT SERPL-CCNC: 54 U/L (ref 10–68)
ANION GAP SERPL CALC-SCNC: 10.3 MMOL/L (ref 8–16)
APTT BLD: 27.6 SECONDS (ref 22.8–39.4)
BASOPHILS NFR BLD AUTO: 0.2 % (ref 0–2)
BILIRUB SERPL-MCNC: 0.32 MG/DL (ref 0.2–1.3)
BUN SERPL-MCNC: 10 MG/DL (ref 7–18)
CALCIUM SERPL-MCNC: 8.9 MG/DL (ref 8.5–10.1)
CHLORIDE SERPL-SCNC: 100 MMOL/L (ref 98–107)
CK MB SERPL-MCNC: 0.5 U/L (ref 0–3.6)
CK SERPL-CCNC: 51 UL (ref 21–215)
CO2 SERPL-SCNC: 30.7 MMOL/L (ref 21–32)
CREAT SERPL-MCNC: 0.6 MG/DL (ref 0.6–1.3)
EOSINOPHIL NFR BLD: 3.3 % (ref 0–7)
ERYTHROCYTE [DISTWIDTH] IN BLOOD BY AUTOMATED COUNT: 17.9 % (ref 11.5–14.5)
GLOBULIN SER-MCNC: 4 G/L
GLUCOSE SERPL-MCNC: 111 MG/DL (ref 74–106)
HCT VFR BLD CALC: 40.6 % (ref 36–48)
HGB BLD-MCNC: 13.5 G/DL (ref 12–16)
IMM GRANULOCYTES NFR BLD: 0.3 % (ref 0–5)
INR PPP: 0.93 (ref 0.85–1.17)
LYMPHOCYTES NFR BLD AUTO: 16.5 % (ref 15–50)
MAGNESIUM SERPL-MCNC: 1.8 MG/DL (ref 1.8–2.4)
MCH RBC QN AUTO: 29 PG (ref 26–34)
MCHC RBC AUTO-ENTMCNC: 33.3 G/DL (ref 31–37)
MCV RBC: 87.1 FL (ref 80–100)
MONOCYTES NFR BLD: 6.2 % (ref 2–11)
NEUTROPHILS NFR BLD AUTO: 73.5 % (ref 40–80)
OSMOLALITY SERPL CALC.SUM OF ELEC: 273 MOSM/KG (ref 275–300)
PLATELET # BLD: 213 10X3/UL (ref 130–400)
PMV BLD AUTO: 9.7 FL (ref 7.4–10.4)
POTASSIUM SERPL-SCNC: 4 MMOL/L (ref 3.5–5.1)
PROT SERPL-MCNC: 7.1 G/DL (ref 6.4–8.2)
PROTHROMBIN TIME: 12 SECONDS (ref 11.6–15)
RBC # BLD AUTO: 4.66 10X6/UL (ref 4–5.4)
SODIUM SERPL-SCNC: 137 MMOL/L (ref 136–145)
TROPONIN I SERPL-MCNC: < 0.017 NG/ML (ref 0–0.06)
WBC # BLD AUTO: 9.2 10X3/UL (ref 4.8–10.8)

## 2019-07-12 ENCOUNTER — HOSPITAL ENCOUNTER (OUTPATIENT)
Dept: HOSPITAL 84 - D.HCCARDIO | Age: 59
Discharge: HOME | End: 2019-07-12
Attending: INTERNAL MEDICINE
Payer: OTHER GOVERNMENT

## 2019-07-12 VITALS — BODY MASS INDEX: 40.8 KG/M2

## 2019-07-12 DIAGNOSIS — I25.10: Primary | ICD-10-CM

## 2021-05-27 ENCOUNTER — HOSPITAL ENCOUNTER (EMERGENCY)
Dept: HOSPITAL 84 - D.ER | Age: 61
Discharge: HOME | End: 2021-05-27
Payer: OTHER GOVERNMENT

## 2021-05-27 VITALS — DIASTOLIC BLOOD PRESSURE: 54 MMHG | SYSTOLIC BLOOD PRESSURE: 129 MMHG

## 2021-05-27 VITALS — WEIGHT: 280.59 LBS | BODY MASS INDEX: 49.71 KG/M2 | HEIGHT: 63 IN

## 2021-05-27 DIAGNOSIS — R07.9: Primary | ICD-10-CM

## 2021-05-27 DIAGNOSIS — R06.02: ICD-10-CM

## 2021-05-27 DIAGNOSIS — J40: ICD-10-CM

## 2021-05-27 LAB
ALBUMIN SERPL-MCNC: 3.1 G/DL (ref 3.4–5)
ALP SERPL-CCNC: 110 U/L (ref 30–120)
ALT SERPL-CCNC: 44 U/L (ref 10–68)
ANION GAP SERPL CALC-SCNC: 8 MMOL/L (ref 8–16)
APTT BLD: 24.7 SECONDS (ref 22.8–39.4)
BASOPHILS NFR BLD AUTO: 0.7 % (ref 0–2)
BILIRUB SERPL-MCNC: 0.32 MG/DL (ref 0.2–1.3)
BUN SERPL-MCNC: 13 MG/DL (ref 7–18)
CALCIUM SERPL-MCNC: 8.6 MG/DL (ref 8.5–10.1)
CHLORIDE SERPL-SCNC: 104 MMOL/L (ref 98–107)
CK MB SERPL-MCNC: 0.2 U/L (ref 0–3.6)
CK SERPL-CCNC: 32 UL (ref 21–215)
CO2 SERPL-SCNC: 30 MMOL/L (ref 21–32)
CREAT SERPL-MCNC: 0.9 MG/DL (ref 0.6–1.3)
EOSINOPHIL NFR BLD: 1.6 % (ref 0–7)
ERYTHROCYTE [DISTWIDTH] IN BLOOD BY AUTOMATED COUNT: 19 % (ref 11.5–14.5)
GLOBULIN SER-MCNC: 4.3 G/L
GLUCOSE SERPL-MCNC: 125 MG/DL (ref 74–106)
HCT VFR BLD CALC: 36.3 % (ref 36–48)
HGB BLD-MCNC: 11.8 G/DL (ref 12–16)
INR PPP: 1.01 (ref 0.85–1.17)
LYMPHOCYTES # BLD: 1.3 10X3/UL (ref 1.18–3.74)
LYMPHOCYTES NFR BLD AUTO: 12.9 % (ref 15–50)
MAGNESIUM SERPL-MCNC: 1.9 MG/DL (ref 1.8–2.4)
MCH RBC QN AUTO: 27.4 PG (ref 26–34)
MCHC RBC AUTO-ENTMCNC: 32.5 G/DL (ref 31–37)
MCV RBC: 84.3 FL (ref 80–100)
MONOCYTES NFR BLD: 4.2 % (ref 2–11)
NEUTROPHILS # BLD: 8 10X3/UL (ref 1.56–6.13)
NEUTROPHILS NFR BLD AUTO: 80.6 % (ref 40–80)
OSMOLALITY SERPL CALC.SUM OF ELEC: 276 MOSM/KG (ref 275–300)
PLATELET # BLD: 248 10X3/UL (ref 130–400)
PMV BLD AUTO: 7.6 FL (ref 7.4–10.4)
POTASSIUM SERPL-SCNC: 4 MMOL/L (ref 3.5–5.1)
PROT SERPL-MCNC: 7.4 G/DL (ref 6.4–8.2)
PROTHROMBIN TIME: 12.3 SECONDS (ref 11.6–15)
RBC # BLD AUTO: 4.3 10X6/UL (ref 4–5.4)
SODIUM SERPL-SCNC: 138 MMOL/L (ref 136–145)
TROPONIN I SERPL-MCNC: < 0.017 NG/ML (ref 0–0.06)
WBC # BLD AUTO: 9.9 10X3/UL (ref 4.8–10.8)